# Patient Record
Sex: FEMALE | Race: BLACK OR AFRICAN AMERICAN | NOT HISPANIC OR LATINO | URBAN - METROPOLITAN AREA
[De-identification: names, ages, dates, MRNs, and addresses within clinical notes are randomized per-mention and may not be internally consistent; named-entity substitution may affect disease eponyms.]

---

## 2017-07-25 ENCOUNTER — EMERGENCY (EMERGENCY)
Facility: HOSPITAL | Age: 27
LOS: 1 days | Discharge: HOME | End: 2017-07-25
Attending: EMERGENCY MEDICINE | Admitting: EMERGENCY MEDICINE
Payer: COMMERCIAL

## 2017-07-25 VITALS
RESPIRATION RATE: 18 BRPM | HEART RATE: 103 BPM | OXYGEN SATURATION: 98 % | TEMPERATURE: 98 F | SYSTOLIC BLOOD PRESSURE: 127 MMHG | DIASTOLIC BLOOD PRESSURE: 77 MMHG

## 2017-07-25 VITALS
SYSTOLIC BLOOD PRESSURE: 107 MMHG | OXYGEN SATURATION: 100 % | RESPIRATION RATE: 18 BRPM | HEART RATE: 88 BPM | DIASTOLIC BLOOD PRESSURE: 69 MMHG | TEMPERATURE: 101 F

## 2017-07-25 DIAGNOSIS — R42 DIZZINESS AND GIDDINESS: ICD-10-CM

## 2017-07-25 DIAGNOSIS — E86.0 DEHYDRATION: ICD-10-CM

## 2017-07-25 DIAGNOSIS — Z33.1 PREGNANT STATE, INCIDENTAL: ICD-10-CM

## 2017-07-25 LAB
ALBUMIN SERPL ELPH-MCNC: 4 G/DL — SIGNIFICANT CHANGE UP (ref 3.3–5)
ALP SERPL-CCNC: 37 U/L — LOW (ref 40–120)
ALT FLD-CCNC: 14 U/L — SIGNIFICANT CHANGE UP (ref 10–45)
ANION GAP SERPL CALC-SCNC: 13 MMOL/L — SIGNIFICANT CHANGE UP (ref 5–17)
APPEARANCE UR: CLEAR — SIGNIFICANT CHANGE UP
AST SERPL-CCNC: 16 U/L — SIGNIFICANT CHANGE UP (ref 10–40)
BASOPHILS NFR BLD AUTO: 0.2 % — SIGNIFICANT CHANGE UP (ref 0–2)
BILIRUB SERPL-MCNC: 1.2 MG/DL — SIGNIFICANT CHANGE UP (ref 0.2–1.2)
BILIRUB UR-MCNC: NEGATIVE — SIGNIFICANT CHANGE UP
BLD GP AB SCN SERPL QL: NEGATIVE — SIGNIFICANT CHANGE UP
BUN SERPL-MCNC: 8 MG/DL — SIGNIFICANT CHANGE UP (ref 7–23)
CALCIUM SERPL-MCNC: 9 MG/DL — SIGNIFICANT CHANGE UP (ref 8.4–10.5)
CHLORIDE SERPL-SCNC: 100 MMOL/L — SIGNIFICANT CHANGE UP (ref 96–108)
CO2 SERPL-SCNC: 21 MMOL/L — LOW (ref 22–31)
COLOR SPEC: YELLOW — SIGNIFICANT CHANGE UP
CREAT SERPL-MCNC: 0.6 MG/DL — SIGNIFICANT CHANGE UP (ref 0.5–1.3)
DIFF PNL FLD: (no result)
EOSINOPHIL NFR BLD AUTO: 1.3 % — SIGNIFICANT CHANGE UP (ref 0–6)
GLUCOSE SERPL-MCNC: 92 MG/DL — SIGNIFICANT CHANGE UP (ref 70–99)
GLUCOSE UR QL: NEGATIVE — SIGNIFICANT CHANGE UP
HCT VFR BLD CALC: 36 % — SIGNIFICANT CHANGE UP (ref 34.5–45)
HGB BLD-MCNC: 12.1 G/DL — SIGNIFICANT CHANGE UP (ref 11.5–15.5)
KETONES UR-MCNC: NEGATIVE — SIGNIFICANT CHANGE UP
LEUKOCYTE ESTERASE UR-ACNC: NEGATIVE — SIGNIFICANT CHANGE UP
LYMPHOCYTES # BLD AUTO: 32.5 % — SIGNIFICANT CHANGE UP (ref 13–44)
MCHC RBC-ENTMCNC: 29.4 PG — SIGNIFICANT CHANGE UP (ref 27–34)
MCHC RBC-ENTMCNC: 33.6 G/DL — SIGNIFICANT CHANGE UP (ref 32–36)
MCV RBC AUTO: 87.6 FL — SIGNIFICANT CHANGE UP (ref 80–100)
MONOCYTES NFR BLD AUTO: 8.7 % — SIGNIFICANT CHANGE UP (ref 2–14)
NEUTROPHILS NFR BLD AUTO: 57.3 % — SIGNIFICANT CHANGE UP (ref 43–77)
NITRITE UR-MCNC: NEGATIVE — SIGNIFICANT CHANGE UP
PH UR: 6.5 — SIGNIFICANT CHANGE UP (ref 5–8)
PLATELET # BLD AUTO: 223 K/UL — SIGNIFICANT CHANGE UP (ref 150–400)
POTASSIUM SERPL-MCNC: 3.6 MMOL/L — SIGNIFICANT CHANGE UP (ref 3.5–5.3)
POTASSIUM SERPL-SCNC: 3.6 MMOL/L — SIGNIFICANT CHANGE UP (ref 3.5–5.3)
PROT SERPL-MCNC: 7.5 G/DL — SIGNIFICANT CHANGE UP (ref 6–8.3)
PROT UR-MCNC: NEGATIVE MG/DL — SIGNIFICANT CHANGE UP
RBC # BLD: 4.11 M/UL — SIGNIFICANT CHANGE UP (ref 3.8–5.2)
RBC # FLD: 11.8 % — SIGNIFICANT CHANGE UP (ref 10.3–16.9)
RH IG SCN BLD-IMP: POSITIVE — SIGNIFICANT CHANGE UP
SODIUM SERPL-SCNC: 134 MMOL/L — LOW (ref 135–145)
SP GR SPEC: <=1.005 — SIGNIFICANT CHANGE UP (ref 1–1.03)
UROBILINOGEN FLD QL: 0.2 E.U./DL — SIGNIFICANT CHANGE UP
WBC # BLD: 4.7 K/UL — SIGNIFICANT CHANGE UP (ref 3.8–10.5)
WBC # FLD AUTO: 4.7 K/UL — SIGNIFICANT CHANGE UP (ref 3.8–10.5)

## 2017-07-25 PROCEDURE — 86850 RBC ANTIBODY SCREEN: CPT

## 2017-07-25 PROCEDURE — 36415 COLL VENOUS BLD VENIPUNCTURE: CPT

## 2017-07-25 PROCEDURE — 80053 COMPREHEN METABOLIC PANEL: CPT

## 2017-07-25 PROCEDURE — 76817 TRANSVAGINAL US OBSTETRIC: CPT | Mod: 26

## 2017-07-25 PROCEDURE — 76801 OB US < 14 WKS SINGLE FETUS: CPT | Mod: 26

## 2017-07-25 PROCEDURE — 84702 CHORIONIC GONADOTROPIN TEST: CPT

## 2017-07-25 PROCEDURE — 93005 ELECTROCARDIOGRAM TRACING: CPT

## 2017-07-25 PROCEDURE — 99284 EMERGENCY DEPT VISIT MOD MDM: CPT | Mod: 25

## 2017-07-25 PROCEDURE — 81001 URINALYSIS AUTO W/SCOPE: CPT

## 2017-07-25 PROCEDURE — 99285 EMERGENCY DEPT VISIT HI MDM: CPT | Mod: 25

## 2017-07-25 PROCEDURE — 85025 COMPLETE CBC W/AUTO DIFF WBC: CPT

## 2017-07-25 PROCEDURE — 76801 OB US < 14 WKS SINGLE FETUS: CPT

## 2017-07-25 PROCEDURE — 76817 TRANSVAGINAL US OBSTETRIC: CPT

## 2017-07-25 PROCEDURE — 86901 BLOOD TYPING SEROLOGIC RH(D): CPT

## 2017-07-25 PROCEDURE — 86900 BLOOD TYPING SEROLOGIC ABO: CPT

## 2017-07-25 PROCEDURE — 93010 ELECTROCARDIOGRAM REPORT: CPT

## 2017-07-25 RX ORDER — SODIUM CHLORIDE 9 MG/ML
1000 INJECTION INTRAMUSCULAR; INTRAVENOUS; SUBCUTANEOUS ONCE
Qty: 0 | Refills: 0 | Status: COMPLETED | OUTPATIENT
Start: 2017-07-25 | End: 2017-07-25

## 2017-07-25 RX ORDER — SODIUM CHLORIDE 9 MG/ML
3 INJECTION INTRAMUSCULAR; INTRAVENOUS; SUBCUTANEOUS ONCE
Qty: 0 | Refills: 0 | Status: COMPLETED | OUTPATIENT
Start: 2017-07-25 | End: 2017-07-25

## 2017-07-25 RX ORDER — ACETAMINOPHEN 500 MG
650 TABLET ORAL ONCE
Qty: 0 | Refills: 0 | Status: COMPLETED | OUTPATIENT
Start: 2017-07-25 | End: 2017-07-25

## 2017-07-25 RX ADMIN — SODIUM CHLORIDE 1000 MILLILITER(S): 9 INJECTION INTRAMUSCULAR; INTRAVENOUS; SUBCUTANEOUS at 08:45

## 2017-07-25 RX ADMIN — SODIUM CHLORIDE 3 MILLILITER(S): 9 INJECTION INTRAMUSCULAR; INTRAVENOUS; SUBCUTANEOUS at 07:43

## 2017-07-25 RX ADMIN — Medication 650 MILLIGRAM(S): at 14:27

## 2017-07-25 RX ADMIN — SODIUM CHLORIDE 1000 MILLILITER(S): 9 INJECTION INTRAMUSCULAR; INTRAVENOUS; SUBCUTANEOUS at 07:43

## 2017-07-25 NOTE — ED PROVIDER NOTE - ATTENDING CONTRIBUTION TO CARE
Based on my assessment patient not in extremis.  Neuro/CV/Pulm system stable. Pelvic US reveals IUP, no ectopic. patient stable at end of ED stay for transition to outpt. care.

## 2017-07-25 NOTE — ED ADULT TRIAGE NOTE - CHIEF COMPLAINT QUOTE
pt received from floor with Nurse manager Guillermo , pt had a near syncopal episode , pt c/o dizziness , no chest pain or SOB , pt states " when I close my eyes I feel the room spinning "

## 2017-07-25 NOTE — ED PROVIDER NOTE - MEDICAL DECISION MAKING DETAILS
pt presents c/o feeling dizzy and weak this am while on her way to work, did not eat breakfast but no improvement w/drinking hot chocolate, dizziness not consistent w/vertigo, pt clinically dehydrated and symptomatically improved w/ivf, however found to be preg on ucg and confirmed w/beta - given symptoms us done to r/o ectopic and + IUP, pt did spike a low grade temp upon d/c ? beginnings of viral syndrome, pt understands to rest and hydrate and f/u w/her gyn for prenatal care and f/u, pt understands and agrees w/plan

## 2017-07-25 NOTE — ED ADULT NURSE NOTE - OBJECTIVE STATEMENT
pt received into spot 20 A&Ox3 appears comfortable brought into ED by nurse manager from floor upstairs. 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 Denies CP SOB N/V fever chills abd  or urinary symptoms. No PMH. Reports she didn't eat this morning.

## 2017-07-25 NOTE — ED PROVIDER NOTE - CARE PLAN
Principal Discharge DX:	Pregnancy as incidental finding Principal Discharge DX:	Pregnancy as incidental finding  Secondary Diagnosis:	Dehydration

## 2017-07-25 NOTE — ED PROVIDER NOTE - OBJECTIVE STATEMENT
The pt is a 27 y/o F, who presents to ED c/o feeling dizzy and weak this am while coming into work. Did not have breakfast or eat prior to coming in, started to feel hot and nauseous, then thing "spin and float when I close my eyes", drank hot chocolate w/o relief. Denies cp, sob, palpitations, syncope, h/a, visual changes, abd pain, emesis, diarrhea, recent illness

## 2017-09-02 ENCOUNTER — HOSPITAL ENCOUNTER (EMERGENCY)
Dept: HOSPITAL 74 - JER | Age: 27
Discharge: LEFT BEFORE DISCHARGE | End: 2017-09-02
Payer: COMMERCIAL

## 2017-09-02 VITALS — SYSTOLIC BLOOD PRESSURE: 118 MMHG | HEART RATE: 70 BPM | DIASTOLIC BLOOD PRESSURE: 69 MMHG

## 2017-09-02 VITALS — TEMPERATURE: 98.1 F

## 2017-09-02 VITALS — BODY MASS INDEX: 24 KG/M2

## 2017-09-02 DIAGNOSIS — Z53.21: Primary | ICD-10-CM

## 2017-09-02 LAB
APPEARANCE UR: CLEAR
BILIRUB UR STRIP.AUTO-MCNC: NEGATIVE MG/DL
COLOR UR: (no result)
KETONES UR QL STRIP: NEGATIVE
LEUKOCYTE ESTERASE UR QL STRIP.AUTO: NEGATIVE
MUCOUS THREADS URNS QL MICRO: (no result)
NITRITE UR QL STRIP: NEGATIVE
PH UR: 5.5 [PH] (ref 5–8)
PROT UR QL STRIP: (no result)
PROT UR QL STRIP: NEGATIVE
RBC # BLD AUTO: 1260 /HPF (ref 0–3)
RBC # UR STRIP: (no result) /UL
SP GR UR: 1.01 (ref 1–1.02)
UROBILINOGEN UR STRIP-MCNC: 0.2 MG/DL (ref 0.2–1)
WBC # UR AUTO: 114 /HPF (ref 3–5)

## 2017-09-02 NOTE — PDOC
History of Present Illness





- General


Chief Complaint: Urinary Problem


Stated Complaint: 10 WEEKS PREGNANT - FREQ URINATION


Time Seen by Provider: 09/02/17 03:17





Past History





- Past Medical History


Allergies/Adverse Reactions: 


 Allergies











Allergy/AdvReac Type Severity Reaction Status Date / Time


 


No Known Allergies Allergy   Verified 09/02/17 01:43











Home Medications: 


Ambulatory Orders





NK [No Known Home Medication]  11/14/15 








Other medical history: denies





- Psycho/Social/Smoking Cessation Hx


Anxiety: No


Suicidal Ideation: No


Smoking History: Never smoked


Have you smoked in the past 12 months: No


Hx Alcohol Use: No


Drug/Substance Use Hx: No


Substance Use Type: None





*Physical Exam





- Vital Signs


 Last Vital Signs











Temp Pulse Resp BP Pulse Ox


 


 98.1 F   79   18   123/61   99 


 


 09/02/17 01:41  09/02/17 01:41  09/02/17 01:41  09/02/17 01:41  09/02/17 01:41














ED Treatment Course





- ADDITIONAL ORDERS


Additional order review: 


 Laboratory  Results











  09/02/17





  03:02


 


Urine Color  Lt. brown


 


Urine Appearance  Clear


 


Urine pH  5.5


 


Urine Protein  Trace H


 


Urine Glucose (UA)  Negative


 


Urine Ketones  Negative


 


Urine Blood  3+ H


 


Urine Nitrite  Negative


 


Urine Bilirubin  Negative


 


Urine Urobilinogen  0.2


 


Ur Leukocyte Esterase  Negative














Medical Decision Making





- Medical Decision Making





09/02/17 07:08


Pt walked out before I could examine her and before her blood type returned.  

She is O+ however, and will not require rhogam.


Pt refusing to go to sonogram this AM, as she has to go home and take care of 

her other kids.  She has hematuria and vag bleed.





*DC/Admit/Observation/Transfer


Diagnosis at time of Disposition: 


 Vaginal discharge





- Referrals


Referrals: 


STAFF,NOT ON [Primary Care Provider] -

## 2017-09-27 ENCOUNTER — EMERGENCY (EMERGENCY)
Facility: HOSPITAL | Age: 27
LOS: 1 days | Discharge: HOME | End: 2017-09-27
Admitting: EMERGENCY MEDICINE
Payer: COMMERCIAL

## 2017-09-27 VITALS
TEMPERATURE: 98 F | OXYGEN SATURATION: 100 % | SYSTOLIC BLOOD PRESSURE: 94 MMHG | RESPIRATION RATE: 18 BRPM | DIASTOLIC BLOOD PRESSURE: 56 MMHG | HEART RATE: 105 BPM

## 2017-09-27 VITALS
OXYGEN SATURATION: 100 % | TEMPERATURE: 98 F | DIASTOLIC BLOOD PRESSURE: 69 MMHG | HEART RATE: 100 BPM | RESPIRATION RATE: 18 BRPM | SYSTOLIC BLOOD PRESSURE: 104 MMHG

## 2017-09-27 DIAGNOSIS — S90.31XA CONTUSION OF RIGHT FOOT, INITIAL ENCOUNTER: ICD-10-CM
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
## 2017-09-27 DIAGNOSIS — M79.671 PAIN IN RIGHT FOOT: ICD-10-CM

## 2017-09-27 DIAGNOSIS — O9A.212 INJURY, POISONING AND CERTAIN OTHER CONSEQUENCES OF EXTERNAL CAUSES COMPLICATING PREGNANCY, SECOND TRIMESTER: ICD-10-CM

## 2017-09-27 DIAGNOSIS — Z79.899 OTHER LONG TERM (CURRENT) DRUG THERAPY: ICD-10-CM
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
## 2017-09-27 DIAGNOSIS — Z3A.14 14 WEEKS GESTATION OF PREGNANCY: ICD-10-CM

## 2017-09-27 PROCEDURE — 99283 EMERGENCY DEPT VISIT LOW MDM: CPT | Mod: 25

## 2017-09-27 PROCEDURE — 73630 X-RAY EXAM OF FOOT: CPT | Mod: 26,RT

## 2017-09-27 PROCEDURE — 73630 X-RAY EXAM OF FOOT: CPT

## 2017-09-27 RX ORDER — ACETAMINOPHEN 500 MG
650 TABLET ORAL ONCE
Qty: 0 | Refills: 0 | Status: COMPLETED | OUTPATIENT
Start: 2017-09-27 | End: 2017-09-27

## 2017-09-27 NOTE — ED ADULT NURSE NOTE - OBJECTIVE STATEMENT
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  Positive pulses noted.  Patient is 14 weeks pregnant. 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  Positive pulses noted.  Patient is 14 weeks pregnant. Cap refill less than 3 secs bilaterally, pedal pulses are good bilaterally.

## 2017-09-27 NOTE — ED PROVIDER NOTE - OBJECTIVE STATEMENT
pt to ed co pain to foot after heavy object fell on foot pt also pregnant no fever no dizzy no headache no chills no NVD no chest pain no SOB no shakes no aches no other  injury no other complaints no vag bleed no cramps no open skin small ecchymosis

## 2017-09-27 NOTE — ED ADULT NURSE REASSESSMENT NOTE - NS ED NURSE REASSESS COMMENT FT1
x-ray results reviewed, ace wrap applied to patients right foot. Patient offered crutches and a cane but refused. KEANU Ireland made aware, patient awaiting discharge paperwork.

## 2017-09-27 NOTE — ED ADULT NURSE REASSESSMENT NOTE - NS ED NURSE REASSESS COMMENT FT1
Patient back from x-ray and awaiting results. Patient offer blanket and ice pack but declined. Patient aware with plan of care, will continue to monitor.

## 2017-09-27 NOTE — ED ADULT TRIAGE NOTE - CHIEF COMPLAINT QUOTE
pt works on [employer] , pt c/o Right foot pain , table fell on her foot , pedal pulses noted , pt has very limited ROM

## 2017-09-27 NOTE — ED ADULT NURSE REASSESSMENT NOTE - NS ED NURSE REASSESS COMMENT FT1
Transfer of care acknowledged with bedside rounding, patient awaiting to go to x-ray and aware with plan of care, will continue to monitor.

## 2017-09-27 NOTE — ED PROVIDER NOTE - MEDICAL DECISION MAKING DETAILS
pt had heavy object fall onto foot no cramps pt 14 wks prego no fx ace and rest fu PMD I have discussed the discharge plan with the patient. The patient agrees with the plan, as discussed.  The patient understands Emergency Department diagnosis is a preliminary diagnosis often based on limited information and that the patient must adhere to the follow-up plan as discussed.  The patient understands that if the symptoms worsen or if prescribed medications do not have the desired/planned effect that the patient may return to the Emergency Department at any time for further evaluation and treatment.

## 2018-01-18 ENCOUNTER — OUTPATIENT (OUTPATIENT)
Dept: EMERGENCY DEPT | Facility: HOSPITAL | Age: 28
LOS: 1 days | End: 2018-01-18
Payer: COMMERCIAL

## 2018-01-18 VITALS
OXYGEN SATURATION: 99 % | HEART RATE: 127 BPM | DIASTOLIC BLOOD PRESSURE: 62 MMHG | SYSTOLIC BLOOD PRESSURE: 113 MMHG | RESPIRATION RATE: 16 BRPM | TEMPERATURE: 98 F

## 2018-01-18 LAB
ALBUMIN SERPL ELPH-MCNC: 3.2 G/DL — LOW (ref 3.3–5)
ALP SERPL-CCNC: 71 U/L — SIGNIFICANT CHANGE UP (ref 40–120)
ALT FLD-CCNC: 21 U/L — SIGNIFICANT CHANGE UP (ref 10–45)
AMYLASE P1 CFR SERPL: 78 U/L — SIGNIFICANT CHANGE UP (ref 25–125)
ANION GAP SERPL CALC-SCNC: 11 MMOL/L — SIGNIFICANT CHANGE UP (ref 5–17)
APPEARANCE UR: CLEAR — SIGNIFICANT CHANGE UP
AST SERPL-CCNC: 26 U/L — SIGNIFICANT CHANGE UP (ref 10–40)
BILIRUB SERPL-MCNC: 0.5 MG/DL — SIGNIFICANT CHANGE UP (ref 0.2–1.2)
BILIRUB UR-MCNC: NEGATIVE — SIGNIFICANT CHANGE UP
BUN SERPL-MCNC: 5 MG/DL — LOW (ref 7–23)
CALCIUM SERPL-MCNC: 9 MG/DL — SIGNIFICANT CHANGE UP (ref 8.4–10.5)
CHLORIDE SERPL-SCNC: 101 MMOL/L — SIGNIFICANT CHANGE UP (ref 96–108)
CO2 SERPL-SCNC: 21 MMOL/L — LOW (ref 22–31)
COLOR SPEC: YELLOW — SIGNIFICANT CHANGE UP
CREAT SERPL-MCNC: 0.53 MG/DL — SIGNIFICANT CHANGE UP (ref 0.5–1.3)
DIFF PNL FLD: NEGATIVE — SIGNIFICANT CHANGE UP
GLUCOSE SERPL-MCNC: 106 MG/DL — HIGH (ref 70–99)
GLUCOSE UR QL: NEGATIVE — SIGNIFICANT CHANGE UP
HCT VFR BLD CALC: 34.5 % — SIGNIFICANT CHANGE UP (ref 34.5–45)
HGB BLD-MCNC: 11.7 G/DL — SIGNIFICANT CHANGE UP (ref 11.5–15.5)
KETONES UR-MCNC: NEGATIVE — SIGNIFICANT CHANGE UP
LDH SERPL L TO P-CCNC: 199 U/L — SIGNIFICANT CHANGE UP (ref 50–242)
LEUKOCYTE ESTERASE UR-ACNC: NEGATIVE — SIGNIFICANT CHANGE UP
LIDOCAIN IGE QN: 34 U/L — SIGNIFICANT CHANGE UP (ref 7–60)
MCHC RBC-ENTMCNC: 30.9 PG — SIGNIFICANT CHANGE UP (ref 27–34)
MCHC RBC-ENTMCNC: 33.9 G/DL — SIGNIFICANT CHANGE UP (ref 32–36)
MCV RBC AUTO: 91 FL — SIGNIFICANT CHANGE UP (ref 80–100)
NITRITE UR-MCNC: NEGATIVE — SIGNIFICANT CHANGE UP
PH UR: 7.5 — SIGNIFICANT CHANGE UP (ref 5–8)
PLATELET # BLD AUTO: 181 K/UL — SIGNIFICANT CHANGE UP (ref 150–400)
POTASSIUM SERPL-MCNC: 4.2 MMOL/L — SIGNIFICANT CHANGE UP (ref 3.5–5.3)
POTASSIUM SERPL-SCNC: 4.2 MMOL/L — SIGNIFICANT CHANGE UP (ref 3.5–5.3)
PROT SERPL-MCNC: 6.6 G/DL — SIGNIFICANT CHANGE UP (ref 6–8.3)
PROT UR-MCNC: NEGATIVE MG/DL — SIGNIFICANT CHANGE UP
RBC # BLD: 3.79 M/UL — LOW (ref 3.8–5.2)
RBC # FLD: 12.8 % — SIGNIFICANT CHANGE UP (ref 10.3–16.9)
SODIUM SERPL-SCNC: 133 MMOL/L — LOW (ref 135–145)
SP GR SPEC: 1.01 — SIGNIFICANT CHANGE UP (ref 1–1.03)
URATE SERPL-MCNC: 3 — SIGNIFICANT CHANGE UP
UROBILINOGEN FLD QL: 0.2 E.U./DL — SIGNIFICANT CHANGE UP
WBC # BLD: 10 K/UL — SIGNIFICANT CHANGE UP (ref 3.8–10.5)
WBC # FLD AUTO: 10 K/UL — SIGNIFICANT CHANGE UP (ref 3.8–10.5)

## 2018-01-18 PROCEDURE — 83690 ASSAY OF LIPASE: CPT

## 2018-01-18 PROCEDURE — 83615 LACTATE (LD) (LDH) ENZYME: CPT

## 2018-01-18 PROCEDURE — 99285 EMERGENCY DEPT VISIT HI MDM: CPT

## 2018-01-18 PROCEDURE — 96360 HYDRATION IV INFUSION INIT: CPT

## 2018-01-18 PROCEDURE — 76818 FETAL BIOPHYS PROFILE W/NST: CPT

## 2018-01-18 PROCEDURE — 82150 ASSAY OF AMYLASE: CPT

## 2018-01-18 PROCEDURE — 80053 COMPREHEN METABOLIC PANEL: CPT

## 2018-01-18 PROCEDURE — 36415 COLL VENOUS BLD VENIPUNCTURE: CPT

## 2018-01-18 PROCEDURE — 81003 URINALYSIS AUTO W/O SCOPE: CPT

## 2018-01-18 PROCEDURE — 84550 ASSAY OF BLOOD/URIC ACID: CPT

## 2018-01-18 PROCEDURE — 85027 COMPLETE CBC AUTOMATED: CPT

## 2018-01-18 RX ORDER — ACETAMINOPHEN 500 MG
650 TABLET ORAL ONCE
Qty: 0 | Refills: 0 | Status: COMPLETED | OUTPATIENT
Start: 2018-01-18 | End: 2018-01-18

## 2018-01-18 RX ORDER — SODIUM CHLORIDE 9 MG/ML
1000 INJECTION, SOLUTION INTRAVENOUS ONCE
Qty: 0 | Refills: 0 | Status: COMPLETED | OUTPATIENT
Start: 2018-01-18 | End: 2018-01-18

## 2018-01-18 RX ADMIN — Medication 650 MILLIGRAM(S): at 11:19

## 2018-01-18 RX ADMIN — SODIUM CHLORIDE 3000 MILLILITER(S): 9 INJECTION, SOLUTION INTRAVENOUS at 10:40

## 2018-01-18 NOTE — ED PROVIDER NOTE - OBJECTIVE STATEMENT
27 yr old female  29 weeks pregnant co abd pain- diffuse sharp crampy abd pain x 1 day  no n/v no f/c + sore throat and cough this am  pain started when she was coughing  no vag bleeding/lof  pnc w  [facility]  exac- coughing  no sig allev factors

## 2018-01-22 ENCOUNTER — OUTPATIENT (OUTPATIENT)
Dept: OUTPATIENT SERVICES | Facility: HOSPITAL | Age: 28
LOS: 1 days | End: 2018-01-22
Payer: COMMERCIAL

## 2018-01-22 ENCOUNTER — EMERGENCY (EMERGENCY)
Facility: HOSPITAL | Age: 28
LOS: 1 days | Discharge: HOME | End: 2018-01-22
Attending: EMERGENCY MEDICINE | Admitting: EMERGENCY MEDICINE
Payer: COMMERCIAL

## 2018-01-22 VITALS
OXYGEN SATURATION: 99 % | DIASTOLIC BLOOD PRESSURE: 65 MMHG | RESPIRATION RATE: 18 BRPM | WEIGHT: 156.75 LBS | TEMPERATURE: 99 F | SYSTOLIC BLOOD PRESSURE: 103 MMHG | HEART RATE: 105 BPM

## 2018-01-22 DIAGNOSIS — Z3A.00 WEEKS OF GESTATION OF PREGNANCY NOT SPECIFIED: ICD-10-CM

## 2018-01-22 DIAGNOSIS — O98.513 OTHER VIRAL DISEASES COMPLICATING PREGNANCY, THIRD TRIMESTER: ICD-10-CM

## 2018-01-22 DIAGNOSIS — J02.9 ACUTE PHARYNGITIS, UNSPECIFIED: ICD-10-CM

## 2018-01-22 DIAGNOSIS — Z79.899 OTHER LONG TERM (CURRENT) DRUG THERAPY: ICD-10-CM

## 2018-01-22 DIAGNOSIS — R11.2 NAUSEA WITH VOMITING, UNSPECIFIED: ICD-10-CM

## 2018-01-22 DIAGNOSIS — Z3A.31 31 WEEKS GESTATION OF PREGNANCY: ICD-10-CM

## 2018-01-22 DIAGNOSIS — O26.899 OTHER SPECIFIED PREGNANCY RELATED CONDITIONS, UNSPECIFIED TRIMESTER: ICD-10-CM

## 2018-01-22 LAB — RAPID RVP RESULT: SIGNIFICANT CHANGE UP

## 2018-01-22 PROCEDURE — 87798 DETECT AGENT NOS DNA AMP: CPT

## 2018-01-22 PROCEDURE — 87633 RESP VIRUS 12-25 TARGETS: CPT

## 2018-01-22 PROCEDURE — 87486 CHLMYD PNEUM DNA AMP PROBE: CPT

## 2018-01-22 PROCEDURE — 99285 EMERGENCY DEPT VISIT HI MDM: CPT

## 2018-01-22 PROCEDURE — 87581 M.PNEUMON DNA AMP PROBE: CPT

## 2018-01-22 PROCEDURE — 99214 OFFICE O/P EST MOD 30 MIN: CPT

## 2018-01-22 NOTE — ED ADULT NURSE NOTE - OBJECTIVE STATEMENT
Pt present to ED 31 week pregnant c/o cough and chest congestion. Upon assessment,  pt AAO x 3 speech is clear and coherent breathing even and unlabored. speaking in full sentences. lung sounds diminished denies chest pain and SOB

## 2018-01-22 NOTE — ED PROVIDER NOTE - OBJECTIVE STATEMENT
Pt is a 28yo f, , ~ 31 wks preg, who p/w c/o sorethroat, cough x few days, with redness and crusting to r eye starting yesterday. + n/v x 1, nbnb emesis. Has been able to tolerate po since then. No diarrhea, fever, chills. No abd pain, vag bleeding, water leakage. + fetal movement. No sick contacts, recent travel.

## 2018-01-22 NOTE — ED PROVIDER NOTE - MEDICAL DECISION MAKING DETAILS
Impression: acute viral illness. RVP neg for influenza. Afebrile. HDS. Case d/w gyn- will admit to L&D for fetal monitoring.

## 2018-01-22 NOTE — ED ADULT TRIAGE NOTE - CHIEF COMPLAINT QUOTE
31 weeks pregnancy  , has flu like symptoms - cough, congestion, "red" eyes', cneeds more time off work 31 weeks pregnancy  , has flu like symptoms - cough, congestion, "red" eyes',; mask in place

## 2018-02-25 ENCOUNTER — OUTPATIENT (OUTPATIENT)
Dept: OUTPATIENT SERVICES | Facility: HOSPITAL | Age: 28
LOS: 1 days | End: 2018-02-25
Payer: COMMERCIAL

## 2018-02-25 DIAGNOSIS — O26.899 OTHER SPECIFIED PREGNANCY RELATED CONDITIONS, UNSPECIFIED TRIMESTER: ICD-10-CM

## 2018-02-25 DIAGNOSIS — Z3A.00 WEEKS OF GESTATION OF PREGNANCY NOT SPECIFIED: ICD-10-CM

## 2018-02-26 DIAGNOSIS — O09.623 SUPERVISION OF YOUNG MULTIGRAVIDA, THIRD TRIMESTER: ICD-10-CM

## 2019-06-12 NOTE — ED ADULT NURSE NOTE - FINAL NURSING ELECTRONIC SIGNATURE
Chart reviewed for pending CDiff results, Lab order still shows not done call to patient to see how many kits she was given she did have the GI pasth done on the stool specimen but not the CDiff, there is no notes that Lab called regarding test not ran for any reason.   18-Jan-2018 09:31

## 2023-04-13 NOTE — ED ADULT TRIAGE NOTE - CADM TRG TX PRIOR TO ARRIVAL
none Follow up with you pediatrician tomorrow. Return for difficulty breathing, fever, vomiting, not urinating, increased wheezing, worsening condition.     Upper Respiratory Infection, Pediatric    An upper respiratory infection (URI) is a common infection of the nose, throat, and upper air passages that lead to the lungs. It is caused by a virus. The most common type of URI is the common cold.    URIs usually get better on their own, without medical treatment. URIs in children may last longer than they do in adults.    What are the causes?  A URI is caused by a virus. Your child may catch a virus by:  Breathing in droplets from an infected person's cough or sneeze.  Touching something that has been exposed to the virus (is contaminated) and then touching the mouth, nose, or eyes.  What increases the risk?  Your child is more likely to get a URI if:  Your child is young.  Your child has close contact with others, such as at school or .  Your child is exposed to tobacco smoke.  Your child has:  A weakened disease-fighting system (immune system).  Certain allergic disorders.  Your child is experiencing a lot of stress.  Your child is doing heavy physical training.  What are the signs or symptoms?  If your child has a URI, he or she may have some of the following symptoms:  Runny or stuffy (congested) nose or sneezing.  Cough or sore throat.  Ear pain.  Fever.  Headache.  Tiredness and decreased physical activity.  Poor appetite.  Changes in sleep pattern or fussy behavior.  How is this diagnosed?  This condition may be diagnosed based on your child's medical history and symptoms and a physical exam. Your child's health care provider may use a swab to take a mucus sample from the nose (nasal swab). This sample can be tested to determine what virus is causing the illness.    How is this treated?  URIs usually get better on their own within 7–10 days. Medicines or antibiotics cannot cure URIs, but your child's health care provider may recommend over-the-counter cold medicines to help relieve symptoms if your child is 6 years of age or older.    Follow these instructions at home:  Medicines    Give your child over-the-counter and prescription medicines only as told by your child's health care provider.  Do not give cold medicines to a child who is younger than 6 years old, unless his or her health care provider approves.  Talk with your child's health care provider:  Before you give your child any new medicines.  Before you try any home remedies such as herbal treatments.  Do not give your child aspirin because of the association with Reye's syndrome.  Relieving symptoms    Use over-the-counter or homemade saline nasal drops, which are made of salt and water, to help relieve congestion. Put 1 drop in each nostril as often as needed.  Do not use nasal drops that contain medicines unless your child's health care provider tells you to use them.  To make saline nasal drops, completely dissolve ½–1 tsp (3–6 g) of salt in 1 cup (237 mL) of warm water.  If your child is 1 year or older, giving 1 tsp (5 mL) of honey before bed may improve symptoms and help relieve coughing at night. Make sure your child brushes his or her teeth after you give honey.  Use a cool-mist humidifier to add moisture to the air. This can help your child breathe more easily.  Activity    Have your child rest as much as possible.  If your child has a fever, keep him or her home from  or school until the fever is gone.  General instructions    A comparison of three sample cups showing dark yellow, yellow, and pale yellow urine.  Have your child drink enough fluids to keep his or her urine pale yellow.  If needed, clean your child's nose gently with a moist, soft cloth. Before cleaning, put a few drops of saline solution around the nose to wet the areas.  Keep your child away from secondhand smoke.  Make sure your child gets all recommended immunizations, including the yearly (annual) flu vaccine.  Keep all follow-up visits. This is important.  How to prevent the spread of infection to others    Washing hands with soap and water.  A child holding a cloth over the mouth and nose while sneezing and coughing.  URIs can be passed from person to person (are contagious). To prevent the infection from spreading:  Have your child wash his or her hands often with soap and water for at least 20 seconds. If soap and water are not available, use hand . You and other caregivers should also wash your hands often.  Encourage your child to not touch his or her mouth, face, eyes, or nose.  Teach your child to cough or sneeze into a tissue or his or her sleeve or elbow instead of into a hand or into the air.  Contact your child's health care provider if:  Your child has a fever, earache, or sore throat. If your child is pulling on the ear, it may be a sign of an earache.  Your child's eyes are red and have a yellow discharge.  The skin under your child's nose becomes painful and crusted or scabbed over.  Get help right away if:  Your child who is younger than 3 months has a temperature of 100.4°F (38°C) or higher.  Your child has trouble breathing.  Your child's skin or fingernails look gray or blue.  Your child has signs of dehydration, such as:  Unusual sleepiness.  Dry mouth.  Being very thirsty.  Little or no urination.  Wrinkled skin.  Dizziness.  No tears.  A sunken soft spot on the top of the head.  These symptoms may be an emergency. Do not wait to see if the symptoms will go away. Get help right away. Call 911.    Summary  An upper respiratory infection (URI) is a common infection of the nose, throat, and upper air passages that lead to the lungs.  A URI is caused by a virus.  Medicines and antibiotics cannot cure URIs. Give your child over-the-counter and prescription medicines only as told by your child's health care provider.  Use over-the-counter or homemade saline nasal drops as needed to help relieve stuffiness (congestion).  This information is not intended to replace advice given to you by your health care provider. Make sure you discuss any questions you have with your health care provider.

## 2023-04-18 ENCOUNTER — HOSPITAL ENCOUNTER (INPATIENT)
Dept: HOSPITAL 74 - JASUSAT | Age: 33
LOS: 1 days | Discharge: HOME | DRG: 832 | End: 2023-04-19
Attending: OBSTETRICS & GYNECOLOGY | Admitting: OBSTETRICS & GYNECOLOGY
Payer: COMMERCIAL

## 2023-04-18 VITALS — BODY MASS INDEX: 30.7 KG/M2

## 2023-04-18 VITALS — RESPIRATION RATE: 18 BRPM

## 2023-04-18 VITALS — HEART RATE: 105 BPM

## 2023-04-18 DIAGNOSIS — O23.43: Primary | ICD-10-CM

## 2023-04-18 DIAGNOSIS — Z3A.33: ICD-10-CM

## 2023-04-18 DIAGNOSIS — N39.0: ICD-10-CM

## 2023-04-18 LAB
ANION GAP SERPL CALC-SCNC: 7 MMOL/L (ref 8–16)
APPEARANCE UR: CLEAR
APTT BLD: 26.7 SECONDS (ref 25.2–36.5)
BACTERIA # UR AUTO: 2359 /UL (ref 0–1359)
BASOPHILS # BLD: 0.6 % (ref 0–2)
BILIRUB UR STRIP.AUTO-MCNC: NEGATIVE MG/DL
BUN SERPL-MCNC: 4.9 MG/DL (ref 7–18)
CALCIUM SERPL-MCNC: 8.7 MG/DL (ref 8.5–10.1)
CASTS URNS QL MICRO: 0 /UL (ref 0–3.1)
CHLORIDE SERPL-SCNC: 107 MMOL/L (ref 98–107)
CO2 SERPL-SCNC: 22 MMOL/L (ref 21–32)
COLOR UR: YELLOW
CREAT SERPL-MCNC: 0.5 MG/DL (ref 0.55–1.3)
DEPRECATED RDW RBC AUTO: 15.5 % (ref 11.6–15.6)
EOSINOPHIL # BLD: 2.3 % (ref 0–4.5)
EPITH CASTS URNS QL MICRO: >36 /UL (ref 0–25.1)
GLUCOSE SERPL-MCNC: 109 MG/DL (ref 74–106)
HCT VFR BLD CALC: 31.5 % (ref 32.4–45.2)
HGB BLD-MCNC: 11.1 GM/DL (ref 10.7–15.3)
INR BLD: 1.04 (ref 0.83–1.09)
KETONES UR QL STRIP: NEGATIVE
LEUKOCYTE ESTERASE UR QL STRIP.AUTO: (no result)
LYMPHOCYTES # BLD: 16.2 % (ref 8–40)
MCH RBC QN AUTO: 31.3 PG (ref 25.7–33.7)
MCHC RBC AUTO-ENTMCNC: 35.3 G/DL (ref 32–36)
MCV RBC: 88.8 FL (ref 80–96)
MONOCYTES # BLD AUTO: 13.2 % (ref 3.8–10.2)
NEUTROPHILS # BLD: 67.7 % (ref 42.8–82.8)
NITRITE UR QL STRIP: NEGATIVE
PH UR: 7.5 [PH] (ref 5–8)
PLATELET # BLD AUTO: 187 10^3/UL (ref 134–434)
PMV BLD: 8 FL (ref 7.5–11.1)
PROT UR QL STRIP: NEGATIVE
PROT UR QL STRIP: NEGATIVE
PT PNL PPP: 12.1 SEC (ref 9.7–13)
RBC # BLD AUTO: 3.54 M/MM3 (ref 3.6–5.2)
RBC # BLD AUTO: 6 /UL (ref 0–23.9)
SODIUM SERPL-SCNC: 137 MMOL/L (ref 136–145)
SP GR UR: 1.01 (ref 1.01–1.03)
UROBILINOGEN UR STRIP-MCNC: 0.2 MG/DL (ref 0.2–1)
WBC # BLD AUTO: 5.6 K/MM3 (ref 4–10)
WBC # UR AUTO: 59 /UL (ref 0–25.8)

## 2023-04-18 RX ADMIN — SODIUM CHLORIDE, SODIUM GLUCONATE, SODIUM ACETATE, POTASSIUM CHLORIDE, AND MAGNESIUM CHLORIDE SCH MLS/HR: 526; 502; 368; 37; 30 INJECTION, SOLUTION INTRAVENOUS at 11:00

## 2023-04-18 RX ADMIN — CEFAZOLIN SCH MLS/HR: 2 INJECTION, POWDER, FOR SOLUTION INTRAMUSCULAR; INTRAVENOUS at 17:08

## 2023-04-18 RX ADMIN — SODIUM CHLORIDE, SODIUM GLUCONATE, SODIUM ACETATE, POTASSIUM CHLORIDE, AND MAGNESIUM CHLORIDE SCH MLS/HR: 526; 502; 368; 37; 30 INJECTION, SOLUTION INTRAVENOUS at 23:46

## 2023-04-18 RX ADMIN — CEFAZOLIN SCH MLS/HR: 2 INJECTION, POWDER, FOR SOLUTION INTRAMUSCULAR; INTRAVENOUS at 12:20

## 2023-04-19 VITALS — DIASTOLIC BLOOD PRESSURE: 64 MMHG | SYSTOLIC BLOOD PRESSURE: 99 MMHG | TEMPERATURE: 98 F

## 2023-04-19 RX ADMIN — CEFAZOLIN SCH MLS/HR: 2 INJECTION, POWDER, FOR SOLUTION INTRAMUSCULAR; INTRAVENOUS at 02:13

## 2023-04-19 RX ADMIN — CEFAZOLIN SCH MLS/HR: 2 INJECTION, POWDER, FOR SOLUTION INTRAMUSCULAR; INTRAVENOUS at 09:00

## 2023-05-15 ENCOUNTER — HOSPITAL ENCOUNTER (INPATIENT)
Dept: HOSPITAL 74 - JDEL | Age: 33
LOS: 4 days | Discharge: HOME | End: 2023-05-19
Attending: OBSTETRICS & GYNECOLOGY | Admitting: OBSTETRICS & GYNECOLOGY
Payer: COMMERCIAL

## 2023-05-15 VITALS — BODY MASS INDEX: 32.7 KG/M2

## 2023-05-15 DIAGNOSIS — Z30.2: ICD-10-CM

## 2023-05-15 DIAGNOSIS — O34.219: ICD-10-CM

## 2023-05-15 DIAGNOSIS — Z3A.37: ICD-10-CM

## 2023-05-15 DIAGNOSIS — K66.0: ICD-10-CM

## 2023-05-15 DIAGNOSIS — O36.63X0: Primary | ICD-10-CM

## 2023-05-15 LAB
ANION GAP SERPL CALC-SCNC: 7 MMOL/L (ref 8–16)
APPEARANCE UR: CLEAR
APTT BLD: 25.6 SECONDS (ref 25.2–36.5)
BACTERIA # UR AUTO: 5005 /UL (ref 0–1359)
BASOPHILS # BLD: 0.5 % (ref 0–2)
BILIRUB UR STRIP.AUTO-MCNC: NEGATIVE MG/DL
BUN SERPL-MCNC: 5.4 MG/DL (ref 7–18)
CALCIUM SERPL-MCNC: 8.8 MG/DL (ref 8.5–10.1)
CASTS URNS QL MICRO: 0 /UL (ref 0–3.1)
CHLORIDE SERPL-SCNC: 108 MMOL/L (ref 98–107)
CO2 SERPL-SCNC: 23 MMOL/L (ref 21–32)
COLOR UR: YELLOW
CREAT SERPL-MCNC: 0.4 MG/DL (ref 0.55–1.3)
DEPRECATED RDW RBC AUTO: 15.4 % (ref 11.6–15.6)
EOSINOPHIL # BLD: 4 % (ref 0–4.5)
EPITH CASTS URNS QL MICRO: >36 /UL (ref 0–25.1)
GLUCOSE SERPL-MCNC: 76 MG/DL (ref 74–106)
HCT VFR BLD CALC: 32.6 % (ref 32.4–45.2)
HGB BLD-MCNC: 11.3 GM/DL (ref 10.7–15.3)
HIV 1+2 AB+HIV1 P24 AG SERPL QL IA: NEGATIVE
INR BLD: 1.01 (ref 0.83–1.09)
KETONES UR QL STRIP: NEGATIVE
LEUKOCYTE ESTERASE UR QL STRIP.AUTO: (no result)
LYMPHOCYTES # BLD: 18.1 % (ref 8–40)
MCH RBC QN AUTO: 30.6 PG (ref 25.7–33.7)
MCHC RBC AUTO-ENTMCNC: 34.6 G/DL (ref 32–36)
MCV RBC: 88.5 FL (ref 80–96)
MONOCYTES # BLD AUTO: 15.5 % (ref 3.8–10.2)
NEUTROPHILS # BLD: 61.9 % (ref 42.8–82.8)
NITRITE UR QL STRIP: NEGATIVE
PH UR: 7 [PH] (ref 5–8)
PLATELET # BLD AUTO: 181 10^3/UL (ref 134–434)
PMV BLD: 8 FL (ref 7.5–11.1)
POTASSIUM SERPLBLD-SCNC: 4.5 MMOL/L (ref 3.5–5.1)
PROT UR QL STRIP: NEGATIVE
PROT UR QL STRIP: NEGATIVE
PT PNL PPP: 11.7 SEC (ref 9.7–13)
RBC # BLD AUTO: 10 /UL (ref 0–23.9)
RBC # BLD AUTO: 3.68 M/MM3 (ref 3.6–5.2)
SODIUM SERPL-SCNC: 138 MMOL/L (ref 136–145)
SP GR UR: 1.01 (ref 1.01–1.03)
UROBILINOGEN UR STRIP-MCNC: 0.2 MG/DL (ref 0.2–1)
WBC # BLD AUTO: 5.8 K/MM3 (ref 4–10)
WBC # UR AUTO: 47 /UL (ref 0–25.8)

## 2023-05-15 PROCEDURE — U0003 INFECTIOUS AGENT DETECTION BY NUCLEIC ACID (DNA OR RNA); SEVERE ACUTE RESPIRATORY SYNDROME CORONAVIRUS 2 (SARS-COV-2) (CORONAVIRUS DISEASE [COVID-19]), AMPLIFIED PROBE TECHNIQUE, MAKING USE OF HIGH THROUGHPUT TECHNOLOGIES AS DESCRIBED BY CMS-2020-01-R: HCPCS

## 2023-05-15 PROCEDURE — U0005 INFEC AGEN DETEC AMPLI PROBE: HCPCS

## 2023-05-15 RX ADMIN — SODIUM CHLORIDE, SODIUM GLUCONATE, SODIUM ACETATE, POTASSIUM CHLORIDE, AND MAGNESIUM CHLORIDE SCH MLS/HR: 526; 502; 368; 37; 30 INJECTION, SOLUTION INTRAVENOUS at 17:30

## 2023-05-16 LAB
BASE EXCESS BLDCOA CALC-SCNC: -3 MMOL/L (ref 0–2)
HCO3 BLDCO-SCNC: 23.5 MMHG (ref 20–29)
PCO2 BLDCO: 46.6 MMHG (ref 30–78)
PH BLDCO: 7.32 [PH] (ref 7.14–7.44)

## 2023-05-16 PROCEDURE — 0DNW0ZZ RELEASE PERITONEUM, OPEN APPROACH: ICD-10-PCS | Performed by: OBSTETRICS & GYNECOLOGY

## 2023-05-16 PROCEDURE — 0UT70ZZ RESECTION OF BILATERAL FALLOPIAN TUBES, OPEN APPROACH: ICD-10-PCS | Performed by: OBSTETRICS & GYNECOLOGY

## 2023-05-16 RX ADMIN — CEFAZOLIN SCH MLS/HR: 1 INJECTION, POWDER, FOR SOLUTION INTRAVENOUS at 18:22

## 2023-05-16 RX ADMIN — SODIUM CHLORIDE, SODIUM GLUCONATE, SODIUM ACETATE, POTASSIUM CHLORIDE, AND MAGNESIUM CHLORIDE SCH: 526; 502; 368; 37; 30 INJECTION, SOLUTION INTRAVENOUS at 20:39

## 2023-05-16 RX ADMIN — CEFAZOLIN SCH MLS/HR: 1 INJECTION, POWDER, FOR SOLUTION INTRAVENOUS at 02:09

## 2023-05-16 RX ADMIN — CEFAZOLIN SCH MLS/HR: 1 INJECTION, POWDER, FOR SOLUTION INTRAVENOUS at 10:05

## 2023-05-16 RX ADMIN — Medication SCH MLS/HR: at 16:15

## 2023-05-16 RX ADMIN — ACETAMINOPHEN SCH: 325 TABLET ORAL at 20:46

## 2023-05-16 RX ADMIN — SODIUM CHLORIDE, SODIUM GLUCONATE, SODIUM ACETATE, POTASSIUM CHLORIDE, AND MAGNESIUM CHLORIDE SCH MLS/HR: 526; 502; 368; 37; 30 INJECTION, SOLUTION INTRAVENOUS at 02:00

## 2023-05-17 LAB
BASOPHILS # BLD: 0.3 % (ref 0–2)
DEPRECATED RDW RBC AUTO: 15.3 % (ref 11.6–15.6)
EOSINOPHIL # BLD: 2 % (ref 0–4.5)
HCT VFR BLD CALC: 31.9 % (ref 32.4–45.2)
HGB BLD-MCNC: 11.2 GM/DL (ref 10.7–15.3)
LYMPHOCYTES # BLD: 9.4 % (ref 8–40)
MCH RBC QN AUTO: 30.9 PG (ref 25.7–33.7)
MCHC RBC AUTO-ENTMCNC: 35.2 G/DL (ref 32–36)
MCV RBC: 87.7 FL (ref 80–96)
MONOCYTES # BLD AUTO: 7.3 % (ref 3.8–10.2)
NEUTROPHILS # BLD: 81 % (ref 42.8–82.8)
PLATELET # BLD AUTO: 162 10^3/UL (ref 134–434)
PMV BLD: 8.6 FL (ref 7.5–11.1)
RBC # BLD AUTO: 3.64 M/MM3 (ref 3.6–5.2)
WBC # BLD AUTO: 6.5 K/MM3 (ref 4–10)

## 2023-05-17 RX ADMIN — OXYCODONE HYDROCHLORIDE SCH MG: 10 TABLET, FILM COATED, EXTENDED RELEASE ORAL at 22:05

## 2023-05-17 RX ADMIN — SIMETHICONE CHEW TAB 80 MG PRN MG: 80 TABLET ORAL at 11:00

## 2023-05-17 RX ADMIN — ACETAMINOPHEN SCH MG: 325 TABLET ORAL at 15:19

## 2023-05-17 RX ADMIN — CEFAZOLIN SCH MLS/HR: 1 INJECTION, POWDER, FOR SOLUTION INTRAVENOUS at 01:42

## 2023-05-17 RX ADMIN — ACETAMINOPHEN SCH MG: 325 TABLET ORAL at 08:00

## 2023-05-17 RX ADMIN — IBUPROFEN PRN MG: 600 TABLET, FILM COATED ORAL at 10:58

## 2023-05-17 RX ADMIN — ACETAMINOPHEN SCH MG: 325 TABLET ORAL at 20:49

## 2023-05-17 RX ADMIN — ACETAMINOPHEN SCH MG: 325 TABLET ORAL at 02:45

## 2023-05-17 RX ADMIN — SIMETHICONE CHEW TAB 80 MG PRN MG: 80 TABLET ORAL at 02:45

## 2023-05-17 RX ADMIN — Medication SCH TAB: at 10:00

## 2023-05-17 RX ADMIN — SIMETHICONE CHEW TAB 80 MG PRN MG: 80 TABLET ORAL at 22:05

## 2023-05-17 RX ADMIN — Medication SCH MLS/HR: at 00:53

## 2023-05-17 RX ADMIN — Medication SCH: at 20:04

## 2023-05-17 RX ADMIN — IBUPROFEN PRN MG: 600 TABLET, FILM COATED ORAL at 23:43

## 2023-05-17 RX ADMIN — CEFAZOLIN SCH MLS/HR: 1 INJECTION, POWDER, FOR SOLUTION INTRAVENOUS at 10:00

## 2023-05-18 RX ADMIN — ACETAMINOPHEN SCH MG: 325 TABLET ORAL at 14:59

## 2023-05-18 RX ADMIN — ACETAMINOPHEN SCH MG: 325 TABLET ORAL at 20:57

## 2023-05-18 RX ADMIN — IBUPROFEN PRN MG: 600 TABLET, FILM COATED ORAL at 20:03

## 2023-05-18 RX ADMIN — OXYCODONE HYDROCHLORIDE SCH MG: 10 TABLET, FILM COATED, EXTENDED RELEASE ORAL at 10:57

## 2023-05-18 RX ADMIN — ACETAMINOPHEN SCH MG: 325 TABLET ORAL at 02:40

## 2023-05-18 RX ADMIN — SIMETHICONE CHEW TAB 80 MG PRN MG: 80 TABLET ORAL at 02:40

## 2023-05-18 RX ADMIN — SIMETHICONE CHEW TAB 80 MG PRN MG: 80 TABLET ORAL at 16:23

## 2023-05-18 RX ADMIN — SIMETHICONE CHEW TAB 80 MG PRN MG: 80 TABLET ORAL at 20:03

## 2023-05-18 RX ADMIN — OXYCODONE HYDROCHLORIDE SCH MG: 10 TABLET, FILM COATED, EXTENDED RELEASE ORAL at 22:05

## 2023-05-18 RX ADMIN — ACETAMINOPHEN SCH MG: 325 TABLET ORAL at 09:05

## 2023-05-18 RX ADMIN — IBUPROFEN PRN MG: 600 TABLET, FILM COATED ORAL at 16:23

## 2023-05-18 RX ADMIN — Medication SCH TAB: at 10:57

## 2023-05-19 VITALS
HEART RATE: 98 BPM | TEMPERATURE: 98.5 F | SYSTOLIC BLOOD PRESSURE: 126 MMHG | DIASTOLIC BLOOD PRESSURE: 73 MMHG | RESPIRATION RATE: 16 BRPM

## 2023-05-19 LAB
BASOPHILS # BLD: 0.4 % (ref 0–2)
DEPRECATED RDW RBC AUTO: 15.5 % (ref 11.6–15.6)
EOSINOPHIL # BLD: 4.9 % (ref 0–4.5)
HCT VFR BLD CALC: 31 % (ref 32.4–45.2)
HGB BLD-MCNC: 10.8 GM/DL (ref 10.7–15.3)
LYMPHOCYTES # BLD: 14.6 % (ref 8–40)
MCH RBC QN AUTO: 30.7 PG (ref 25.7–33.7)
MCHC RBC AUTO-ENTMCNC: 34.8 G/DL (ref 32–36)
MCV RBC: 88.3 FL (ref 80–96)
MONOCYTES # BLD AUTO: 12.2 % (ref 3.8–10.2)
NEUTROPHILS # BLD: 67.9 % (ref 42.8–82.8)
PLATELET # BLD AUTO: 216 10^3/UL (ref 134–434)
PMV BLD: 7.6 FL (ref 7.5–11.1)
RBC # BLD AUTO: 3.51 M/MM3 (ref 3.6–5.2)
WBC # BLD AUTO: 8.6 K/MM3 (ref 4–10)

## 2023-05-19 RX ADMIN — IBUPROFEN PRN MG: 600 TABLET, FILM COATED ORAL at 06:01

## 2023-05-19 RX ADMIN — ACETAMINOPHEN SCH MG: 325 TABLET ORAL at 09:22

## 2023-05-19 RX ADMIN — OXYCODONE HYDROCHLORIDE SCH MG: 10 TABLET, FILM COATED, EXTENDED RELEASE ORAL at 10:35

## 2023-05-19 RX ADMIN — SIMETHICONE CHEW TAB 80 MG PRN MG: 80 TABLET ORAL at 02:36

## 2023-05-19 RX ADMIN — Medication SCH TAB: at 10:37

## 2023-05-19 RX ADMIN — ACETAMINOPHEN SCH MG: 325 TABLET ORAL at 02:36

## 2024-08-04 NOTE — ED ADULT NURSE NOTE - NS ED NURSE DC INFO COMPLEXITY
(4) walks frequently Simple: Patient demonstrates quick and easy understanding/Verbalized Understanding

## 2024-11-25 ENCOUNTER — HOSPITAL ENCOUNTER (OUTPATIENT)
Dept: HOSPITAL 74 - JASU-SURG | Age: 34
Discharge: HOME | End: 2024-11-25
Attending: OBSTETRICS & GYNECOLOGY
Payer: COMMERCIAL

## 2024-11-25 VITALS
DIASTOLIC BLOOD PRESSURE: 61 MMHG | HEART RATE: 78 BPM | TEMPERATURE: 97.7 F | RESPIRATION RATE: 18 BRPM | SYSTOLIC BLOOD PRESSURE: 119 MMHG

## 2024-11-25 VITALS — BODY MASS INDEX: 21.9 KG/M2

## 2024-11-25 DIAGNOSIS — N84.0: ICD-10-CM

## 2024-11-25 DIAGNOSIS — D25.0: ICD-10-CM

## 2024-11-25 DIAGNOSIS — N84.1: Primary | ICD-10-CM

## 2024-11-25 DIAGNOSIS — N92.0: ICD-10-CM

## 2024-11-25 PROCEDURE — 0UB98ZZ EXCISION OF UTERUS, VIA NATURAL OR ARTIFICIAL OPENING ENDOSCOPIC: ICD-10-PCS | Performed by: OBSTETRICS & GYNECOLOGY

## 2024-11-25 RX ADMIN — ACETAMINOPHEN ONE MG: 10 INJECTION, SOLUTION INTRAVENOUS at 10:37
